# Patient Record
Sex: FEMALE | Race: WHITE | NOT HISPANIC OR LATINO
[De-identification: names, ages, dates, MRNs, and addresses within clinical notes are randomized per-mention and may not be internally consistent; named-entity substitution may affect disease eponyms.]

---

## 2017-11-07 ENCOUNTER — RECORDS - HEALTHEAST (OUTPATIENT)
Dept: ADMINISTRATIVE | Facility: OTHER | Age: 47
End: 2017-11-07

## 2017-11-19 ENCOUNTER — RECORDS - HEALTHEAST (OUTPATIENT)
Dept: ADMINISTRATIVE | Facility: OTHER | Age: 47
End: 2017-11-19

## 2017-11-24 ENCOUNTER — OFFICE VISIT - HEALTHEAST (OUTPATIENT)
Dept: INTERNAL MEDICINE | Facility: CLINIC | Age: 47
End: 2017-11-24

## 2017-11-24 ENCOUNTER — COMMUNICATION - HEALTHEAST (OUTPATIENT)
Dept: TELEHEALTH | Facility: CLINIC | Age: 47
End: 2017-11-24

## 2017-11-24 DIAGNOSIS — E78.5 HYPERLIPIDEMIA: ICD-10-CM

## 2017-11-24 DIAGNOSIS — Z12.31 ENCOUNTER FOR SCREENING MAMMOGRAM FOR BREAST CANCER: ICD-10-CM

## 2017-11-24 DIAGNOSIS — Z76.89 ENCOUNTER TO ESTABLISH CARE: ICD-10-CM

## 2017-11-24 DIAGNOSIS — J45.20 MILD INTERMITTENT ASTHMA WITHOUT COMPLICATION: ICD-10-CM

## 2017-11-24 DIAGNOSIS — Z13.29 SCREENING FOR THYROID DISORDER: ICD-10-CM

## 2017-11-24 DIAGNOSIS — N39.3 STRESS INCONTINENCE IN FEMALE: ICD-10-CM

## 2017-11-24 DIAGNOSIS — Z23 ENCOUNTER FOR VACCINATION: ICD-10-CM

## 2017-11-24 DIAGNOSIS — Z01.89 ENCOUNTER FOR ROUTINE LABORATORY TESTING: ICD-10-CM

## 2017-11-24 DIAGNOSIS — Z85.3 HX OF BREAST CANCER: ICD-10-CM

## 2017-11-24 DIAGNOSIS — Z76.0 ENCOUNTER FOR MEDICATION REFILL: ICD-10-CM

## 2017-11-24 RX ORDER — ALBUTEROL SULFATE 0.83 MG/ML
2.5 SOLUTION RESPIRATORY (INHALATION) EVERY 4 HOURS PRN
Qty: 50 VIAL | Refills: 3 | Status: SHIPPED | OUTPATIENT
Start: 2017-11-24

## 2017-11-24 RX ORDER — LORATADINE 10 MG/1
TABLET ORAL
Status: SHIPPED | COMMUNITY
Start: 2017-11-24

## 2017-11-24 RX ORDER — ACETAMINOPHEN 500 MG
500 TABLET ORAL
Status: SHIPPED | COMMUNITY
Start: 2017-11-24

## 2017-11-24 RX ORDER — IBUPROFEN 200 MG
200 TABLET ORAL
Status: SHIPPED | COMMUNITY
Start: 2010-10-13

## 2017-11-25 ENCOUNTER — COMMUNICATION - HEALTHEAST (OUTPATIENT)
Dept: INTERNAL MEDICINE | Facility: CLINIC | Age: 47
End: 2017-11-25

## 2018-02-27 ENCOUNTER — COMMUNICATION - HEALTHEAST (OUTPATIENT)
Dept: INTERNAL MEDICINE | Facility: CLINIC | Age: 48
End: 2018-02-27

## 2018-03-08 ENCOUNTER — COMMUNICATION - HEALTHEAST (OUTPATIENT)
Dept: INTERNAL MEDICINE | Facility: CLINIC | Age: 48
End: 2018-03-08

## 2018-03-19 ENCOUNTER — HOSPITAL ENCOUNTER (OUTPATIENT)
Dept: MAMMOGRAPHY | Facility: CLINIC | Age: 48
Discharge: HOME | End: 2018-03-19

## 2018-03-19 DIAGNOSIS — Z12.31 ENCOUNTER FOR SCREENING MAMMOGRAM FOR BREAST CANCER: ICD-10-CM

## 2018-07-13 ENCOUNTER — OFFICE VISIT - HEALTHEAST (OUTPATIENT)
Dept: FAMILY MEDICINE | Facility: CLINIC | Age: 48
End: 2018-07-13

## 2018-07-13 ENCOUNTER — COMMUNICATION - HEALTHEAST (OUTPATIENT)
Dept: INTERNAL MEDICINE | Facility: CLINIC | Age: 48
End: 2018-07-13

## 2018-07-13 DIAGNOSIS — S91.332A PUNCTURE WOUND OF LEFT FOOT, INITIAL ENCOUNTER: ICD-10-CM

## 2018-07-18 ENCOUNTER — RECORDS - HEALTHEAST (OUTPATIENT)
Dept: ADMINISTRATIVE | Facility: OTHER | Age: 48
End: 2018-07-18

## 2018-08-23 ENCOUNTER — RECORDS - HEALTHEAST (OUTPATIENT)
Dept: ADMINISTRATIVE | Facility: OTHER | Age: 48
End: 2018-08-23

## 2018-08-27 ENCOUNTER — OFFICE VISIT - HEALTHEAST (OUTPATIENT)
Dept: FAMILY MEDICINE | Facility: CLINIC | Age: 48
End: 2018-08-27

## 2018-08-27 DIAGNOSIS — N39.3 FEMALE STRESS INCONTINENCE: ICD-10-CM

## 2018-08-27 DIAGNOSIS — J45.20 MILD INTERMITTENT ASTHMA WITHOUT COMPLICATION: ICD-10-CM

## 2018-08-27 DIAGNOSIS — Z85.3 HX OF BREAST CANCER: ICD-10-CM

## 2018-08-27 DIAGNOSIS — Z01.818 PREOPERATIVE EXAMINATION: ICD-10-CM

## 2018-08-27 LAB
ALBUMIN SERPL-MCNC: 3.8 G/DL (ref 3.5–5)
ALP SERPL-CCNC: 74 U/L (ref 45–120)
ALT SERPL W P-5'-P-CCNC: 16 U/L (ref 0–45)
ANION GAP SERPL CALCULATED.3IONS-SCNC: 10 MMOL/L (ref 5–18)
AST SERPL W P-5'-P-CCNC: 16 U/L (ref 0–40)
BASOPHILS # BLD AUTO: 0 THOU/UL (ref 0–0.2)
BASOPHILS NFR BLD AUTO: 1 % (ref 0–2)
BILIRUB SERPL-MCNC: 0.2 MG/DL (ref 0–1)
BUN SERPL-MCNC: 11 MG/DL (ref 8–22)
CALCIUM SERPL-MCNC: 10.1 MG/DL (ref 8.5–10.5)
CHLORIDE BLD-SCNC: 108 MMOL/L (ref 98–107)
CO2 SERPL-SCNC: 26 MMOL/L (ref 22–31)
CREAT SERPL-MCNC: 0.81 MG/DL (ref 0.6–1.1)
EOSINOPHIL # BLD AUTO: 0.4 THOU/UL (ref 0–0.4)
EOSINOPHIL NFR BLD AUTO: 6 % (ref 0–6)
ERYTHROCYTE [DISTWIDTH] IN BLOOD BY AUTOMATED COUNT: 12.1 % (ref 11–14.5)
GFR SERPL CREATININE-BSD FRML MDRD: >60 ML/MIN/1.73M2
GLUCOSE BLD-MCNC: 102 MG/DL (ref 70–125)
HCT VFR BLD AUTO: 41.1 % (ref 35–47)
HGB BLD-MCNC: 14.1 G/DL (ref 12–16)
INR PPP: 0.87 (ref 0.9–1.1)
LYMPHOCYTES # BLD AUTO: 2.1 THOU/UL (ref 0.8–4.4)
LYMPHOCYTES NFR BLD AUTO: 35 % (ref 20–40)
MCH RBC QN AUTO: 29.4 PG (ref 27–34)
MCHC RBC AUTO-ENTMCNC: 34.4 G/DL (ref 32–36)
MCV RBC AUTO: 85 FL (ref 80–100)
MONOCYTES # BLD AUTO: 0.4 THOU/UL (ref 0–0.9)
MONOCYTES NFR BLD AUTO: 7 % (ref 2–10)
NEUTROPHILS # BLD AUTO: 3 THOU/UL (ref 2–7.7)
NEUTROPHILS NFR BLD AUTO: 51 % (ref 50–70)
PLATELET # BLD AUTO: 267 THOU/UL (ref 140–440)
PMV BLD AUTO: 7.9 FL (ref 7–10)
POTASSIUM BLD-SCNC: 5 MMOL/L (ref 3.5–5)
PROT SERPL-MCNC: 6.9 G/DL (ref 6–8)
RBC # BLD AUTO: 4.81 MILL/UL (ref 3.8–5.4)
SODIUM SERPL-SCNC: 144 MMOL/L (ref 136–145)
WBC: 5.9 THOU/UL (ref 4–11)

## 2018-08-27 ASSESSMENT — MIFFLIN-ST. JEOR: SCORE: 1374.78

## 2018-08-28 ENCOUNTER — COMMUNICATION - HEALTHEAST (OUTPATIENT)
Dept: FAMILY MEDICINE | Facility: CLINIC | Age: 48
End: 2018-08-28

## 2018-09-26 ENCOUNTER — RECORDS - HEALTHEAST (OUTPATIENT)
Dept: ADMINISTRATIVE | Facility: OTHER | Age: 48
End: 2018-09-26

## 2018-12-07 ENCOUNTER — COMMUNICATION - HEALTHEAST (OUTPATIENT)
Dept: INTERNAL MEDICINE | Facility: CLINIC | Age: 48
End: 2018-12-07

## 2018-12-07 DIAGNOSIS — J45.20 MILD INTERMITTENT ASTHMA WITHOUT COMPLICATION: ICD-10-CM

## 2019-05-07 ENCOUNTER — COMMUNICATION - HEALTHEAST (OUTPATIENT)
Dept: SCHEDULING | Facility: CLINIC | Age: 49
End: 2019-05-07

## 2019-05-07 ENCOUNTER — OFFICE VISIT - HEALTHEAST (OUTPATIENT)
Dept: FAMILY MEDICINE | Facility: CLINIC | Age: 49
End: 2019-05-07

## 2019-05-07 DIAGNOSIS — Z12.31 VISIT FOR SCREENING MAMMOGRAM: ICD-10-CM

## 2019-05-07 DIAGNOSIS — M25.562 ACUTE PAIN OF LEFT KNEE: ICD-10-CM

## 2019-05-07 ASSESSMENT — MIFFLIN-ST. JEOR: SCORE: 1397.01

## 2019-05-08 ENCOUNTER — COMMUNICATION - HEALTHEAST (OUTPATIENT)
Dept: FAMILY MEDICINE | Facility: CLINIC | Age: 49
End: 2019-05-08

## 2019-05-10 ENCOUNTER — COMMUNICATION - HEALTHEAST (OUTPATIENT)
Dept: FAMILY MEDICINE | Facility: CLINIC | Age: 49
End: 2019-05-10

## 2019-06-10 ENCOUNTER — COMMUNICATION - HEALTHEAST (OUTPATIENT)
Dept: INTERNAL MEDICINE | Facility: CLINIC | Age: 49
End: 2019-06-10

## 2019-06-10 DIAGNOSIS — J45.20 MILD INTERMITTENT ASTHMA WITHOUT COMPLICATION: ICD-10-CM

## 2019-06-11 RX ORDER — ALBUTEROL SULFATE 90 UG/1
AEROSOL, METERED RESPIRATORY (INHALATION)
Qty: 18 G | Refills: 5 | Status: SHIPPED | OUTPATIENT
Start: 2019-06-11

## 2019-08-19 ENCOUNTER — HOSPITAL ENCOUNTER (OUTPATIENT)
Dept: MAMMOGRAPHY | Facility: CLINIC | Age: 49
Discharge: HOME | End: 2019-08-19
Attending: FAMILY MEDICINE

## 2019-08-19 DIAGNOSIS — Z12.31 VISIT FOR SCREENING MAMMOGRAM: ICD-10-CM

## 2019-08-21 ENCOUNTER — COMMUNICATION - HEALTHEAST (OUTPATIENT)
Dept: FAMILY MEDICINE | Facility: CLINIC | Age: 49
End: 2019-08-21

## 2019-08-23 ENCOUNTER — HOSPITAL ENCOUNTER (OUTPATIENT)
Dept: MAMMOGRAPHY | Facility: CLINIC | Age: 49
Discharge: HOME | End: 2019-08-23
Attending: FAMILY MEDICINE

## 2019-08-23 DIAGNOSIS — N64.89 BREAST ASYMMETRY: ICD-10-CM

## 2019-12-26 ENCOUNTER — COMMUNICATION - HEALTHEAST (OUTPATIENT)
Dept: FAMILY MEDICINE | Facility: CLINIC | Age: 49
End: 2019-12-26

## 2019-12-26 DIAGNOSIS — J45.20 MILD INTERMITTENT ASTHMA WITHOUT COMPLICATION: ICD-10-CM

## 2019-12-28 RX ORDER — BUDESONIDE AND FORMOTEROL FUMARATE DIHYDRATE 80; 4.5 UG/1; UG/1
2 AEROSOL RESPIRATORY (INHALATION) 2 TIMES DAILY
Qty: 1 INHALER | Refills: 5 | Status: SHIPPED | OUTPATIENT
Start: 2019-12-28

## 2020-01-06 ENCOUNTER — OFFICE VISIT - HEALTHEAST (OUTPATIENT)
Dept: FAMILY MEDICINE | Facility: CLINIC | Age: 50
End: 2020-01-06

## 2020-01-06 ENCOUNTER — COMMUNICATION - HEALTHEAST (OUTPATIENT)
Dept: SCHEDULING | Facility: CLINIC | Age: 50
End: 2020-01-06

## 2020-01-06 DIAGNOSIS — Z23 NEED FOR VACCINATION: ICD-10-CM

## 2020-01-06 DIAGNOSIS — R14.0 ABDOMINAL BLOATING: ICD-10-CM

## 2020-01-06 LAB
ALBUMIN SERPL-MCNC: 4 G/DL (ref 3.5–5)
ALP SERPL-CCNC: 84 U/L (ref 45–120)
ALT SERPL W P-5'-P-CCNC: 25 U/L (ref 0–45)
ANION GAP SERPL CALCULATED.3IONS-SCNC: 11 MMOL/L (ref 5–18)
AST SERPL W P-5'-P-CCNC: 20 U/L (ref 0–40)
BILIRUB SERPL-MCNC: 0.3 MG/DL (ref 0–1)
BUN SERPL-MCNC: 7 MG/DL (ref 8–22)
CALCIUM SERPL-MCNC: 9.9 MG/DL (ref 8.5–10.5)
CHLORIDE BLD-SCNC: 106 MMOL/L (ref 98–107)
CO2 SERPL-SCNC: 26 MMOL/L (ref 22–31)
CREAT SERPL-MCNC: 0.79 MG/DL (ref 0.6–1.1)
ERYTHROCYTE [DISTWIDTH] IN BLOOD BY AUTOMATED COUNT: 12.8 % (ref 11–14.5)
GFR SERPL CREATININE-BSD FRML MDRD: >60 ML/MIN/1.73M2
GLUCOSE BLD-MCNC: 98 MG/DL (ref 70–125)
HCT VFR BLD AUTO: 45.3 % (ref 35–47)
HGB BLD-MCNC: 14.9 G/DL (ref 12–16)
MCH RBC QN AUTO: 28.3 PG (ref 27–34)
MCHC RBC AUTO-ENTMCNC: 33 G/DL (ref 32–36)
MCV RBC AUTO: 86 FL (ref 80–100)
PLATELET # BLD AUTO: 310 THOU/UL (ref 140–440)
PMV BLD AUTO: 8.5 FL (ref 7–10)
POTASSIUM BLD-SCNC: 4.7 MMOL/L (ref 3.5–5)
PROT SERPL-MCNC: 7.6 G/DL (ref 6–8)
RBC # BLD AUTO: 5.29 MILL/UL (ref 3.8–5.4)
SODIUM SERPL-SCNC: 143 MMOL/L (ref 136–145)
TSH SERPL DL<=0.005 MIU/L-ACNC: 1.21 UIU/ML (ref 0.3–5)
WBC: 6.2 THOU/UL (ref 4–11)

## 2020-01-06 ASSESSMENT — MIFFLIN-ST. JEOR: SCORE: 1457.33

## 2020-01-06 ASSESSMENT — PATIENT HEALTH QUESTIONNAIRE - PHQ9: SUM OF ALL RESPONSES TO PHQ QUESTIONS 1-9: 4

## 2020-01-07 ENCOUNTER — HOSPITAL ENCOUNTER (OUTPATIENT)
Dept: ULTRASOUND IMAGING | Facility: HOSPITAL | Age: 50
Discharge: HOME | End: 2020-01-07
Attending: FAMILY MEDICINE

## 2020-01-07 ENCOUNTER — COMMUNICATION - HEALTHEAST (OUTPATIENT)
Dept: FAMILY MEDICINE | Facility: CLINIC | Age: 50
End: 2020-01-07

## 2020-01-07 DIAGNOSIS — J45.20 MILD INTERMITTENT ASTHMA WITHOUT COMPLICATION: ICD-10-CM

## 2020-01-07 DIAGNOSIS — R14.0 ABDOMINAL BLOATING: ICD-10-CM

## 2021-05-25 ENCOUNTER — RECORDS - HEALTHEAST (OUTPATIENT)
Dept: ADMINISTRATIVE | Facility: CLINIC | Age: 51
End: 2021-05-25

## 2021-05-26 ASSESSMENT — PATIENT HEALTH QUESTIONNAIRE - PHQ9: SUM OF ALL RESPONSES TO PHQ QUESTIONS 1-9: 4

## 2021-05-27 ENCOUNTER — RECORDS - HEALTHEAST (OUTPATIENT)
Dept: ADMINISTRATIVE | Facility: CLINIC | Age: 51
End: 2021-05-27

## 2021-05-28 ASSESSMENT — ASTHMA QUESTIONNAIRES: ACT_TOTALSCORE: 21

## 2021-05-28 NOTE — PROGRESS NOTES
Result is/are normal.  No fracture seen.  Try to stay off the knee, pain medication and ice for the pain and if no better by next week, come back into the clinic.

## 2021-05-28 NOTE — PROGRESS NOTES
Family Medicine Office Visit  [facility]  Patient Name: Sara Bee  Patient Age: 48 y.o.  YOB: 1970  MRN: [id number]    Date of Visit: 2019  Reason for Office Visit:   Chief Complaint   Patient presents with     left knee pain x3 days- fall           Assessment / Plan / Medical Decision Makin. Visit for screening mammogram  - Mammo Screening Right; Future    2. Acute pain of left knee  Will do pain medication - thought to be more contusion.  Continue with ice and rest, pain medication acutely and if no improvement by next week return to office for more imaging.  - HYDROcodone-acetaminophen 5-325 mg per tablet; Take 1 tablet by mouth every 6 (six) hours as needed for pain.  Dispense: 50 tablet; Refill: 0  - XR Knee Left Plus Tunnel VW; Future  - XR Knee Left Plus Tunnel VW        Health Maintenance Review  Health Maintenance   Topic Date Due     DEPRESSION FOLLOW UP  1970     ASTHMA FOLLOW-UP  1970     ADVANCE DIRECTIVES DISCUSSED WITH PATIENT  1990     MAMMOGRAM  2019     ASTHMA CONTROL TEST  2020     TD 18+ HE  2028     INFLUENZA VACCINE RULE BASED  Completed     TDAP ADULT ONE TIME DOSE  Completed     PAP SMEAR  Discontinued         I am having Sara Bee start on HYDROcodone-acetaminophen. I am also having her maintain her acetaminophen, loratadine, ibuprofen, albuterol, albuterol, cholecalciferol (vitamin D3), and SYMBICORT.      HPI:  Sara Bee is a 48 y.o. year old who presents to the office today for fall on .  Walking out of cub and tripped on curb and then landed on the right arm and left knee.   Went straight down on the left knee.  Swelling bruising.  Using ice and off work yesterday, elevated, ibuprofen.  Pain 8/10.  No numbness or tingling.         Review of Systems- pertinent positive in bold:  Constitutional: Fever, chills, night sweats, fainting, weight change, fatigue, seizures,  dizziness, sleeping difficulties, loud snoring/pauses in breathing  Eyes: change in vision, blurred or double vision, redness/eye pain  Ears, nose, mouth, throat: change in hearing, ear pain, hoarseness, difficulty swallowing, sores in the mouth or throat  Respiratory: shortness of breath, cough, bloody sputum, wheezing  Cardiovascular: chest pain, palpitations   Gastrointestinal: abdominal pain, heartburn/indigestion, nausea/vomiting, change in appetite, change in bowel habits, constipation or diarrhea, rectal bleeding/dark stools, difficulty swallowing  Urinary: painful urination, frequent urination, urinary urgency/incontinence, blood in urine/dark urine, nocturia  Musculoskeletal: backache/back pain (new or increasing), weakness, joint pain/stiffness (new or increasing), muscle cramps, swelling of hands, feet, ankles, leg pain/redness  Skin: change in moles/freckles, rash, nodules  Hematologic/lymphatic: swollen lymph glands, abnormal bruising/bleeding  Endocrine: excessive thirst/urination, cold or heat intolerance  Neurologic/emotional: worrisome memory change, numbness/tingling, anxiety, mood swings      Current Scheduled Meds:  Outpatient Encounter Medications as of 5/7/2019   Medication Sig Dispense Refill     acetaminophen (TYLENOL) 500 MG tablet Take 500 mg by mouth.       albuterol (PROAIR HFA;PROVENTIL HFA;VENTOLIN HFA) 90 mcg/actuation inhaler Inhale 2 puffs every 6 (six) hours as needed for wheezing. 1 each 0     cholecalciferol, vitamin D3, (VITAMIN D3) 5,000 unit Tab Take 1 tablet by mouth daily.       ibuprofen (ADVIL,MOTRIN) 200 MG tablet Take 200 mg by mouth.       loratadine (CLARITIN) 10 mg tablet Take by mouth.       SYMBICORT 80-4.5 mcg/actuation inhaler INHALE 2 PUFFS TWICE DAILY 1 Inhaler 5     albuterol (PROVENTIL) 2.5 mg /3 mL (0.083 %) nebulizer solution Take 3 mL (2.5 mg total) by nebulization every 4 (four) hours as needed for wheezing. 50 vial 3     HYDROcodone-acetaminophen 5-325 mg  "per tablet Take 1 tablet by mouth every 6 (six) hours as needed for pain. 50 tablet 0     No facility-administered encounter medications on file as of 5/7/2019.      Past Medical History:   Diagnosis Date     Anxiety     previously tried Wellbutrin     Asthma 2/24/2009     Headache, occipital     Severe at times - began in her teens; tx's with NSAIDs     Hx of breast cancer 11/22/2010    s/p Chemo therapy     Raynaud's disease      Raynauds phenomenon      Past Surgical History:   Procedure Laterality Date     APPENDECTOMY  11/2010     BREAST BIOPSY Left 2008    benign     BREAST RECONSTRUCTION Left 05/2009    Silicone Implant?     BREAST SURGERY Right 09/2008    Breast Lift     FOOT SURGERY       HUMERUS FRACTURE SURGERY Left 10/2012     MASTECTOMY Left 09/2008     TOTAL ABDOMINAL HYSTERECTOMY W/ BILATERAL SALPINGOOPHORECTOMY  11/2010    Benign Mass - Dr. Alcantara     Social History     Tobacco Use     Smoking status: Never Smoker     Smokeless tobacco: Never Used   Substance Use Topics     Alcohol use: Yes     Alcohol/week: 1.2 - 1.8 oz     Types: 2 - 3 Cans of beer per week     Comment: Occasional     Drug use: No       Objective / Physical Examination:  Vitals:    05/07/19 1403   BP: 118/74   Patient Site: Right Arm   Patient Position: Sitting   Cuff Size: Adult Regular   Pulse: 70   SpO2: 98%   Weight: 168 lb 14.4 oz (76.6 kg)   Height: 5' 5\" (1.651 m)     Wt Readings from Last 3 Encounters:   05/07/19 168 lb 14.4 oz (76.6 kg)   08/27/18 164 lb (74.4 kg)   07/13/18 158 lb 9.6 oz (71.9 kg)     BP Readings from Last 3 Encounters:   05/07/19 118/74   08/27/18 130/82   07/13/18 106/56     Body mass index is 28.11 kg/m .   Results for orders placed or performed in visit on 08/27/18   Comprehensive Metabolic Panel   Result Value Ref Range    Sodium 144 136 - 145 mmol/L    Potassium 5.0 3.5 - 5.0 mmol/L    Chloride 108 (H) 98 - 107 mmol/L    CO2 26 22 - 31 mmol/L    Anion Gap, Calculation 10 5 - 18 mmol/L    Glucose 102 " 70 - 125 mg/dL    BUN 11 8 - 22 mg/dL    Creatinine 0.81 0.60 - 1.10 mg/dL    GFR MDRD Af Amer >60 >60 mL/min/1.73m2    GFR MDRD Non Af Amer >60 >60 mL/min/1.73m2    Bilirubin, Total 0.2 0.0 - 1.0 mg/dL    Calcium 10.1 8.5 - 10.5 mg/dL    Protein, Total 6.9 6.0 - 8.0 g/dL    Albumin 3.8 3.5 - 5.0 g/dL    Alkaline Phosphatase 74 45 - 120 U/L    AST 16 0 - 40 U/L    ALT 16 0 - 45 U/L   INR   Result Value Ref Range    INR 0.87 (L) 0.90 - 1.10   HM1 (CBC with Diff)   Result Value Ref Range    WBC 5.9 4.0 - 11.0 thou/uL    RBC 4.81 3.80 - 5.40 mill/uL    Hemoglobin 14.1 12.0 - 16.0 g/dL    Hematocrit 41.1 35.0 - 47.0 %    MCV 85 80 - 100 fL    MCH 29.4 27.0 - 34.0 pg    MCHC 34.4 32.0 - 36.0 g/dL    RDW 12.1 11.0 - 14.5 %    Platelets 267 140 - 440 thou/uL    MPV 7.9 7.0 - 10.0 fL    Neutrophils % 51 50 - 70 %    Lymphocytes % 35 20 - 40 %    Monocytes % 7 2 - 10 %    Eosinophils % 6 0 - 6 %    Basophils % 1 0 - 2 %    Neutrophils Absolute 3.0 2.0 - 7.7 thou/uL    Lymphocytes Absolute 2.1 0.8 - 4.4 thou/uL    Monocytes Absolute 0.4 0.0 - 0.9 thou/uL    Eosinophils Absolute 0.4 0.0 - 0.4 thou/uL    Basophils Absolute 0.0 0.0 - 0.2 thou/uL           General Appearance: Alert and oriented, cooperative, affect appropriate, speech clear, in no apparent distress  Head: Normocephalic, atraumatic  Extremities: Pulses 2+ and equal throughout.left knee large ecchymosis on the knee with varying degree of discoloration.  Minimal swelling, full extension but pain with flexion, no joint laxity appreciated.  Integumentary: Warm and dry. Without suspicious looking lesions  Neuro: Alert and oriented, follows commands appropriately.     Orders Placed This Encounter   Procedures     XR Knee Left Plus Tunnel VW     Mammo Screening Right   Followup: No follow-ups on file. earlier if needed.    Total time spent with patient was 15 min with >50% of time spent in face-to-face counseling regarding the above plan       Johanny Frankel MD

## 2021-05-28 NOTE — TELEPHONE ENCOUNTER
Fall Sunday and hit on left knee.  Very swollen and painful.  Ice and elevation yesterday.    No appts on mychart till Thursday.    Patient needs to be seen.    Transferred to scheduling for an appointment for today.    Jacqueline Booth RN, [facility] Nurse Triage/Med Refills RN       Reason for Disposition    SEVERE pain (e.g., excruciating)    Protocols used: KNEE INJURY-A-OH

## 2021-05-29 NOTE — TELEPHONE ENCOUNTER
Refill Approved    Rx renewed per Medication Renewal Policy. Medication was last renewed on 11/24/17. Last visit 5/7/19.    Karlee Rosenthal, [facility] Triage/Med Refill 6/11/2019     Requested Prescriptions   Pending Prescriptions Disp Refills     albuterol (PROAIR HFA;PROVENTIL HFA;VENTOLIN HFA) 90 mcg/actuation inhaler [Pharmacy Med Name: ALBUTEROL HFA INH (200 PUFFS) 18GM] 18 g 0     Sig: INHALE 2 PUFFS EVERY 6 HOURS AS NEEDED FOR WHEEZING       Albuterol/Levalbuterol Refill Protocol Passed - 6/10/2019  7:37 AM        Passed - PCP or prescribing provider visit in last year     Last office visit with prescriber/PCP: 11/24/2017 Campbell Wagner CNP OR same dept: Visit date not found OR same specialty: 11/24/2017 Campbell Wagner CNP Last physical: Visit date not found       Next appt within 3 mo: Visit date not found  Next physical within 3 mo: Visit date not found  Prescriber OR PCP: Campbell Wagner CNP  Last diagnosis associated with med order: 1. Mild intermittent asthma without complication  - albuterol (PROAIR HFA;PROVENTIL HFA;VENTOLIN HFA) 90 mcg/actuation inhaler [Pharmacy Med Name: ALBUTEROL HFA INH (200 PUFFS) 18GM]; INHALE 2 PUFFS EVERY 6 HOURS AS NEEDED FOR WHEEZING  Dispense: 18 g; Refill: 0    If protocol passes may refill for 6 months if within 3 months of last provider visit (or a total of 9 months). If patient requesting >1 inhaler per month refill x 6 months and have patient make appointment with provider.

## 2021-05-30 ENCOUNTER — RECORDS - HEALTHEAST (OUTPATIENT)
Dept: ADMINISTRATIVE | Facility: CLINIC | Age: 51
End: 2021-05-30

## 2021-05-31 ENCOUNTER — RECORDS - HEALTHEAST (OUTPATIENT)
Dept: ADMINISTRATIVE | Facility: CLINIC | Age: 51
End: 2021-05-31

## 2021-05-31 VITALS — WEIGHT: 161.9 LBS

## 2021-06-01 VITALS — WEIGHT: 158.6 LBS

## 2021-06-01 VITALS — HEIGHT: 65 IN | WEIGHT: 164 LBS | BODY MASS INDEX: 27.32 KG/M2

## 2021-06-03 VITALS — BODY MASS INDEX: 28.14 KG/M2 | WEIGHT: 168.9 LBS | HEIGHT: 65 IN

## 2021-06-04 VITALS
WEIGHT: 182.2 LBS | BODY MASS INDEX: 30.35 KG/M2 | DIASTOLIC BLOOD PRESSURE: 80 MMHG | HEART RATE: 81 BPM | SYSTOLIC BLOOD PRESSURE: 120 MMHG | OXYGEN SATURATION: 99 % | HEIGHT: 65 IN

## 2021-06-04 NOTE — PROGRESS NOTES
Assessment:     1. Abdominal bloating  Comprehensive Metabolic Panel    HM2(CBC w/o Differential)    Thyroid Sinking Spring    US Abdomen Complete       Plan:     1. Abdominal bloating  Patient will try to do 10,000 steps per day and lose a little weight; will work up as follows  - Comprehensive Metabolic Panel  - HM2(CBC w/o Differential)  - Thyroid Sinking Spring  - US Abdomen Complete; Future      Subjective:   This is my first visit with Sara.  She is a little upset with herself as she was told by her mother-in-law that she looked bloated over the holidays.  She does state that she did overeat during the holidays.  However I recheck her weight 6 months ago and her weight is the same 100 and pounds.  I suggested the patient can increase her aerobic exercise to 10,000 steps per day.  She denies any nausea or vomiting hematemesis hemoptysis cough shortness of breath dyspnea chest pain angina bowel changes.  I have reviewed her chart.  Examination is essentially unremarkable.  I want to rule out any intra-abdominal causes that are causing this postprandial bloat and I will do an ultrasound and the above blood work-up.  However I did reassure the patient that based on my clinical exam this first visit I do not think anything acute or subacute is going on.  She was reassured.  She will follow-up with her primary care physician and sooner as I get the test results.    Review of Systems: A complete 14 point review of systems was obtained and is negative or as stated in the history of present illness.    Past Medical History:   Diagnosis Date     Anxiety     previously tried Wellbutrin     Asthma 2/24/2009     Headache, occipital     Severe at times - began in her teens; tx's with NSAIDs     Hx of breast cancer 11/22/2010    s/p Chemo therapy     Raynaud's disease      Raynauds phenomenon      Family History   Problem Relation Age of Onset     Lung cancer Mother         s/p RLLobectomy; Spinal stenosis     Hypertension Mother       "Hyperlipidemia Mother      Cancer Father         abdominal sarcoma     No Medical Problems Sister      No Medical Problems Brother      Glaucoma Sister         son with detached retinas     No Medical Problems Son      No Medical Problems Son      Breast cancer Neg Hx      Past Surgical History:   Procedure Laterality Date     APPENDECTOMY  11/2010     BREAST BIOPSY Left 2008    benign     BREAST RECONSTRUCTION Left 05/2009    Silicone Implant?     BREAST SURGERY Right 09/2008    Breast Lift     FOOT SURGERY       HUMERUS FRACTURE SURGERY Left 10/2012     MASTECTOMY Left 09/2008     TOTAL ABDOMINAL HYSTERECTOMY W/ BILATERAL SALPINGOOPHORECTOMY  11/2010    Benign Mass - Dr. Alcantara     Social History     Tobacco Use     Smoking status: Never Smoker     Smokeless tobacco: Never Used   Substance Use Topics     Alcohol use: Yes     Alcohol/week: 2.0 - 3.0 standard drinks     Types: 2 - 3 Cans of beer per week     Comment: Occasional     Drug use: No         Objective:   /80 (Patient Site: Left Arm, Patient Position: Sitting, Cuff Size: Adult Regular)   Pulse 81   Ht 5' 5\" (1.651 m)   Wt 182 lb 3.2 oz (82.6 kg)   SpO2 99%   BMI 30.32 kg/m      General Appearance:  Alert, cooperative, no distress  Head:  Normocephalic, no obvious abnormality  Ears: TM anatomy normal  Eyes:  PERRL, EOM's intact, conjunctiva and corneas clear  Nose:  Nares symmetrical, septum midline, mucosa pink, no sinus tenderness  Throat:  Lips, tongue, and mucosa are moist, pink, and intact  Neck:  Supple, symmetrical, trachea midline, no adenopathy; thyroid: no enlargement, symmetric,no tenderness/mass/nodules; no carotid bruit, no JVD  Back:  Symmetrical, no curvature, ROM normal, no CVA tenderness  Chest/Breast:  No mass or tenderness  Lungs:  Clear to auscultation bilaterally, respirations unlabored   Heart:  Normal PMI, regular rate & rhythm, S1 and S2 normal, no murmurs, rubs, or gallops  Abdomen:  Soft, non-tender, bowel sounds active " all four quadrants, no mass, or organomegaly  Musculoskeletal:  Tone and strength strong and symmetrical, all extremities  Lymphatic:  No adenopathy  Skin/Hair/Nails:  Skin warm, dry, and intact, no rashes  Neurologic:  Alert and oriented x3, no cranial nerve deficits, normal strength and tone, gait steady  Extremities:  No edema.  Elly's sign negative.    Genitourinary: deferred  Pulses:  Equal bilaterally     The following high BMI interventions were performed this visit: encouragement to exercise      This note has been dictated using voice recognition software. Any grammatical or context distortions are unintentional and inherent to the the software.

## 2021-06-04 NOTE — TELEPHONE ENCOUNTER
Refill Approved    Rx renewed per Medication Renewal Policy. Medication was last renewed on 12/7/18.    Elsi Linda, [facility] Triage/Med Refill 12/28/2019     Requested Prescriptions   Pending Prescriptions Disp Refills     budesonide-formoterol (SYMBICORT) 80-4.5 mcg/actuation inhaler 1 Inhaler 5     Sig: Inhale 2 puffs 2 (two) times a day.       Asthma Medications Refill Protocol Passed - 12/26/2019 12:31 PM        Passed - PCP or prescribing provider visit in last year     Last office visit with prescriber/PCP: 5/7/2019 Johanny Frankel MD OR same dept: 5/7/2019 Johanny Frankel MD OR same specialty: 5/7/2019 Johanny Frankel MD  Last physical: 8/27/2018 Last MTM visit: Visit date not found    Next appt within 3 mo: Visit date not found Next physical within 3 mo: Visit date not found  Prescriber OR PCP: Johanny Frankel MD  Last diagnosis associated with med order: 1. Mild intermittent asthma without complication  - budesonide-formoterol (SYMBICORT) 80-4.5 mcg/actuation inhaler; Inhale 2 puffs 2 (two) times a day.  Dispense: 1 Inhaler; Refill: 5    If protocol passes may refill for 6 months if within 3 months of last provider visit (or a total of 9 months).

## 2021-06-04 NOTE — TELEPHONE ENCOUNTER
Triage call:   Difficulty breathing- feels bloated under her breasts- has to lay back to take a deep breath- shortness of breath when she was tying her shoes- she reports that it has been happening more frequently.   ON going for 2 months - around the holidays it was worse-comes and goes   She reports that now she feels shortness of breath now  Has asthma- takes her inhaler as prescribed  She feels like she has Increased pressure on her diaphragm  Mild shortness of breath   No issues prior- asthma was well controlled  She reports that she is over weight as well and is wondering if her symptoms may be related to this- would like to start a program   Denies additional symptoms at this time.     Triaged to be seen in the office now- reviewed additional care advice with patient and she verbalizes understanding. Patient warm transferred to scheduling for appointment. Appointment scheduled with Dr Goodman @ 9:20 am today.     Loyda Urbina RN BSBA [facility] Triage/Med Refill 1/6/2020 8:08 AM    Reason for Disposition    MILD difficulty breathing (e.g., minimal/no SOB at rest, SOB with walking, pulse < 100) of new onset or worse than normal    Protocols used: BREATHING DIFFICULTY-A-OH

## 2021-06-05 NOTE — TELEPHONE ENCOUNTER
----- Message from Jason Goodman MD sent at 1/7/2020 10:28 AM CST -----  plz call and tell her resullts are normal

## 2021-06-05 NOTE — TELEPHONE ENCOUNTER
Subjective:   This is my first visit with Sara.  She is a little upset with herself as she was told by her mother-in-law that she looked bloated over the holidays.  She does state that she did overeat during the holidays.  However I recheck her weight 6 months ago and her weight is the same 100 and pounds.  I suggested the patient can increase her aerobic exercise to 10,000 steps per day.  She denies any nausea or vomiting hematemesis hemoptysis cough shortness of breath dyspnea chest pain angina bowel changes.  I have reviewed her chart.  Examination is essentially unremarkable.  I want to rule out any intra-abdominal causes that are causing this postprandial bloat and I will do an ultrasound and the above blood work-up.  However I did reassure the patient that based on my clinical exam this first visit I do not think anything acute or subacute is going on.  She was reassured.  She will follow-up with her primary care physician and sooner as I get the test results.       US Abdomen Complete  Narrative: EXAM: US ABDOMEN COMPLETE  LOCATION: [facility]  DATE/TIME: 1/7/2020 6:47 AM    INDICATION: Abdominal distension (gaseous)  COMPARISON: None.  TECHNIQUE: Complete abdominal ultrasound.    FINDINGS:    GALLBLADDER: Normal. No gallstones, wall thickening, or pericholecystic fluid. Negative sonographic Hooper's sign.    BILE DUCTS: No biliary dilatation. The common duct measures 3 mm.    LIVER: Normal parenchyma with smooth contour. No focal mass.    RIGHT KIDNEY: 10.4 x 4.3 x 5.1 cm Normal echogenicity with no hydronephrosis or mass.     LEFT KIDNEY: 10.4 x 4.8 x 5.5 Normal echogenicity with no hydronephrosis or mass.     SPLEEN: Normal.    PANCREAS: The visualized portions are normal. The pancreas is largely obscured by bowel gas.    AORTA: Normal in caliber.     IVC: Normal where visualized.    No ascites.  Impression: 1.  Pancreas partially obscured by bowel gas and suboptimally visualized. Otherwise  normal abdominal ultrasound.

## 2021-06-16 PROBLEM — F33.1 MAJOR DEPRESSIVE DISORDER, RECURRENT EPISODE, MODERATE (H): Status: ACTIVE | Noted: 2017-11-24

## 2021-06-19 NOTE — LETTER
Letter by Johanny Frankel MD at      Author: Johanny Frankel MD Service: -- Author Type: --    Filed:  Encounter Date: 5/8/2019 Status: (Other)         Sara Bee  [id number] [facility] [id number]             May 8, 2019         Dear Ms. Bee,    Below are the results from your recent visit:    Resulted Orders   XR Knee Left Plus Tunnel VW    Narrative    EXAM DATE:         05/07/2019    EXAM: X-RAY KNEE, LEFT, 3 VIEWS  LOCATION: [facility]  DATE/TIME: 5/7/2019 2:30 PM    INDICATION: Pain in left knee  COMPARISON: None.    FINDINGS: Mild degenerative change medial compartment left knee. No evidence for  fracture or significant effusion.                  Result is/are normal.  No fracture seen.  Try to stay off the knee, pain medication and ice for the  pain and if no better by next week, come back into the clinic.     Please call with questions or contact us using [employer].    Sincerely,        Electronically signed by Johanny Frankel MD

## 2021-06-20 NOTE — PROGRESS NOTES
Preoperative Exam    Scheduled Procedure: Laparoscopic Bladder Repair    Surgery Date:  9/11/18  Surgery Location: [facility]     Surgeon:  Dr. Estrada     Assessment/Plan:     1. Female stress incontinence  Plan for surgery    2. Preoperative examination  Cleared for surgery - no absolute contraindications to surgery  - Comprehensive Metabolic Panel  - HM1(CBC and Differential)  - INR  - APTT(PTT)  - HM1 (CBC with Diff)    3. Hx of breast cancer  Continue with current management    4. Mild intermittent asthma without complication  Well controlled and ACT reviewed today.          Surgical Procedure Risk: Low (reported cardiac risk generally < 1%)  Have you had prior anesthesia?: Yes  Have you or any family members had a previous anesthesia reaction:  No  Do you or any family members have a history of a clotting or bleeding disorder?: No  Cardiac Risk Assessment: no increased risk for major cardiac complications    Patient approved for surgery with general or local anesthesia.    Please Note:    Functional Status: Independent  Patient plans to recover at home with family.     Subjective:      Sara Bee is a 48 y.o. female who presents for a preoperative consultation.  Stress incontinence for many years and planning on bladder repair.  Hx of breast cancer with left mastectomy.  Hx of mild intermittent asthma and well controlled.    All other systems reviewed and are negative, other than those listed in the HPI.    Pertinent History  Do you have difficulty breathing or chest pain after walking up a flight of stairs: No  History of obstructive sleep apnea: No  Steroid use in the last 6 months: Yes: Symbicort   Frequent Aspirin/NSAID use: No  Prior Blood Transfusion: No  Prior Blood Transfusion Reaction: No  If for some reason prior to, during or after the procedure, if it is medically indicated, would you be willing to have a blood transfusion?:  There is no transfusion refusal.    Current Outpatient  Prescriptions   Medication Sig Dispense Refill     acetaminophen (TYLENOL) 500 MG tablet Take 500 mg by mouth.       albuterol (PROAIR HFA;PROVENTIL HFA;VENTOLIN HFA) 90 mcg/actuation inhaler Inhale 2 puffs every 6 (six) hours as needed for wheezing. 1 each 0     albuterol (PROVENTIL) 2.5 mg /3 mL (0.083 %) nebulizer solution Take 3 mL (2.5 mg total) by nebulization every 4 (four) hours as needed for wheezing. 50 vial 3     budesonide-formoterol (SYMBICORT) 80-4.5 mcg/actuation inhaler Inhale 2 puffs 2 (two) times a day. 1 Inhaler 11     cholecalciferol, vitamin D3, (VITAMIN D3) 5,000 unit Tab Take 1 tablet by mouth daily.       ibuprofen (ADVIL,MOTRIN) 200 MG tablet Take 200 mg by mouth.       loratadine (CLARITIN) 10 mg tablet Take by mouth.       No current facility-administered medications for this visit.         No Known Allergies    Patient Active Problem List   Diagnosis     Hx of breast cancer     Major depressive disorder, recurrent episode, moderate (H)     Ovarian cyst     Asthma       Past Medical History:   Diagnosis Date     Anxiety     previously tried Wellbutrin     Asthma 2/24/2009     Headache, occipital     Severe at times - began in her teens; tx's with NSAIDs     Hx of breast cancer 11/22/2010    s/p Chemo therapy     Raynaud's disease      Raynauds phenomenon        Past Surgical History:   Procedure Laterality Date     APPENDECTOMY  11/2010     BREAST BIOPSY Left 2008    benign     BREAST RECONSTRUCTION Left 05/2009    Silicone Implant?     BREAST SURGERY Right 09/2008    Breast Lift     FOOT SURGERY       HUMERUS FRACTURE SURGERY Left 10/2012     MASTECTOMY Left 09/2008     TOTAL ABDOMINAL HYSTERECTOMY W/ BILATERAL SALPINGOOPHORECTOMY  11/2010    Benign Mass - Dr. 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[employer]  "    Social History Main Topics     Smoking status: Never Smoker     Smokeless tobacco: Never Used     Alcohol use 1.2 - 1.8 oz/week     2 - 3 Cans of beer per week      Comment: Occasional     Drug use: No     Sexual activity: Not Currently     Partners: Male     Birth control/ protection: Surgical     Other Topics Concern     Not on file     Social History Narrative       Patient Care Team:  Johanny Frankel MD as PCP - General (Family Medicine)          Objective:     Vitals:    08/27/18 0754   BP: 130/82   Pulse: 85   SpO2: 99%   Weight: 164 lb (74.4 kg)   Height: 5' 5\" (1.651 m)         Physical Exam:    Physical Exam:  General Appearance: Alert, cooperative, no distress, appears stated age   Head: Normocephalic, without obvious abnormality, atraumatic  Eyes: PERRL, conjunctiva/corneas clear, EOM's intact   Ears: Normal TM's and external ear canals, both ears  Nose:Nares normal, septum midline,mucosa normal, no drainage    Throat:Lips, mucosa, and tongue normal; teeth and gums normal  Neck: Supple, symmetrical, trachea midline, no adenopathy;  thyroid: not enlarged, symmetric, no tenderness/mass/nodules  Back: Symmetric, no curvature, ROM normal,  Lungs: Clear to auscultation bilaterally, respirations unlabored  Heart: Regular rate and rhythm, S1 and S2 normal, no murmur, rub, or gallop  Abdomen: Soft, non-tender, bowel sounds active all four quadrants,  no masses, no organomegaly  Extremities: Extremities normal, atraumatic, no cyanosis or edema  Skin: Skin color, texture, turgor normal, no rashes or lesions  Lymph nodes: Cervical, supraclavicular, and axillary nodes normal  Neurologic: Normal        There are no Patient Instructions on file for this visit.    EKG:  n/a    Labs:  Recent Results (from the past 240 hour(s))   INR    Collection Time: 08/27/18  8:31 AM   Result Value Ref Range    INR 0.87 (L) 0.90 - 1.10   HM1 (CBC with Diff)    Collection Time: 08/27/18  8:31 AM   Result Value Ref Range    WBC 5.9 " 4.0 - 11.0 thou/uL    RBC 4.81 3.80 - 5.40 mill/uL    Hemoglobin 14.1 12.0 - 16.0 g/dL    Hematocrit 41.1 35.0 - 47.0 %    MCV 85 80 - 100 fL    MCH 29.4 27.0 - 34.0 pg    MCHC 34.4 32.0 - 36.0 g/dL    RDW 12.1 11.0 - 14.5 %    Platelets 267 140 - 440 thou/uL    MPV 7.9 7.0 - 10.0 fL    Neutrophils % 51 50 - 70 %    Lymphocytes % 35 20 - 40 %    Monocytes % 7 2 - 10 %    Eosinophils % 6 0 - 6 %    Basophils % 1 0 - 2 %    Neutrophils Absolute 3.0 2.0 - 7.7 thou/uL    Lymphocytes Absolute 2.1 0.8 - 4.4 thou/uL    Monocytes Absolute 0.4 0.0 - 0.9 thou/uL    Eosinophils Absolute 0.4 0.0 - 0.4 thou/uL    Basophils Absolute 0.0 0.0 - 0.2 thou/uL       Immunization History   Administered Date(s) Administered     Influenza, Seasonal, Inj PF IIV3 09/25/2009     Influenza,seasonal quad, PF, 36+MOS 11/24/2017     Influenza,seasonal, Inj IIV3 10/16/2007, 12/14/2011, 11/30/2012     Pneumo Polysac 23-V 12/14/2011     Td, Adult, Absorbed 08/01/2006     Tdap 07/13/2018           Electronically signed by Johanny Frankel MD 08/27/18 7:56 AM

## 2021-06-20 NOTE — PROGRESS NOTES
Result is/are normal.  Please fax result to surgeon with the note. Call if any questions or concerns.

## 2021-06-25 NOTE — PROGRESS NOTES
Progress Notes by Campbell Wagner at 11/24/2017  8:00 AM     Author: Campbell Wagner Service: -- Author Type: Nurse Practitioner    Filed: 1/7/2018  4:31 PM Encounter Date: 11/24/2017 Status: Signed    : [facility]    Date of Service: 11/24/2017  Primary Provider: Campbell Wagner CNP    Patient Care Team:  Campbell Wagner CNP as PCP - General (Nurse Practitioner)     ______________________________________________________________________     Patient's Pharmacy:    [id number] - [place], [place] - [address] AT [facility] [address]  [address]  [facility] [address]  Phone: [phone] Fax: [phone]     Patient's Insurance:    Payor: BLUE CROSS / Plan: BLUE CROSS COMP CARE / Product Type: PPO/POS/FFS /     ______________________________________________________________________    Assessment:    1. Encounter to establish care    2. Encounter for medication refill    3. Encounter for screening mammogram for breast cancer    4. Hx of breast cancer    5. Stress incontinence in female    6. Mild intermittent asthma without complication    7. Encounter for routine laboratory testing    8. Screening for thyroid disorder    9. Hyperlipidemia    10. Encounter for vaccination       ______________________________________________________________________      PHQ-2 Total Score: 0 (11/24/2017  8:30 AM)     Plan:  Patient Instructions   1. Imaging tests ordered at this visit:  - Mammo Screening Bilateral    2. Referrals placed today:  - Ambulatory referral to Oncology  - Ambulatory referral to Urology    3. Medications prescribed at this visit:  - budesonide-formoterol (SYMBICORT) 80-4.5 mcg/actuation inhaler; Inhale 2 puffs 2 (two) times a day.  Dispense: 1 Inhaler; Refill: 11  - albuterol (PROVENTIL) 2.5 mg /3 mL (0.083 %) nebulizer solution; Take 3 mL (2.5 mg total) by nebulization every 4 (four) hours as  needed for wheezing.  Dispense: 50 vial; Refill: 3  - albuterol (PROAIR HFA;PROVENTIL HFA;VENTOLIN HFA) 90 mcg/actuation inhaler; Inhale 2 puffs every 6 (six) hours as needed for wheezing.  Dispense: 1 each; Refill: 0    4. Labs ordered today:  - Comprehensive Metabolic Panel  - HM2(CBC w/o Differential)  - Thyroid Stimulating Hormone (TSH)  - Lipid Cascade FASTING; Future    5. Vaccinations given today:  - Influenza, Seasonal,Quad Inj, 36+ MOS    6. Continue current medications    7. Lab results will be available via [employer].      ______________________________________________________________________     Sara Bee is 47 y.o. female who comes in today for:    Chief Complaint   Patient presents with   ? Establish Care     Refill on asthma meds        Patient Active Problem List   Diagnosis   ? Hx of breast cancer   ? Major depressive disorder, recurrent episode, moderate   ? Ovarian cyst   ? Asthma     Past Medical History:   Diagnosis Date   ? Anxiety     previously tried Wellbutrin   ? Asthma 2/24/2009   ? Headache, occipital     Severe at times - began in her teens; tx's with NSAIDs   ? Hx of breast cancer 11/22/2010    s/p Chemo therapy   ? Raynauds phenomenon        Past Surgical History:   Procedure Laterality Date   ? APPENDECTOMY  11/2010   ? BREAST RECONSTRUCTION Left 05/2009    Silicone Implant?   ? BREAST SURGERY Right 09/2008    Breast Lift   ? HUMERUS FRACTURE SURGERY Left 10/2012   ? MASTECTOMY Left 09/2008   ? TOTAL ABDOMINAL HYSTERECTOMY W/ BILATERAL SALPINGOOPHORECTOMY  11/2010    Benign Mass - Dr. Alcantara       No Known Allergies    Current Outpatient Prescriptions   Medication Sig Note   ? acetaminophen (TYLENOL) 500 MG tablet Take 500 mg by mouth. 11/24/2017: Received from: [facility] Received Sig: Take 500 mg by mouth every 6 hours if needed. Max acetaminophen dose: 4000mg in 24 hrs.    ? albuterol (PROVENTIL) 2.5 mg /3 mL (0.083 %) nebulizer solution Take 3  mL (2.5 mg total) by nebulization every 4 (four) hours as needed for wheezing.    ? budesonide-formoterol (SYMBICORT) 80-4.5 mcg/actuation inhaler Inhale 2 puffs 2 (two) times a day.    ? ibuprofen (ADVIL,MOTRIN) 200 MG tablet Take 200 mg by mouth. 11/24/2017: Received from: [facility] Received Sig: Take 1 tablet by mouth 4 times daily if needed.   ? loratadine (CLARITIN) 10 mg tablet Take by mouth. 11/24/2017: Received from: [facility] Received Sig: take one tab as needed   ? albuterol (PROAIR HFA;PROVENTIL HFA;VENTOLIN HFA) 90 mcg/actuation inhaler Inhale 2 puffs every 6 (six) hours as needed for wheezing.      Social History     Social History   ? Marital status:      Spouse name: Kelby   ? Number of children: 2   ? Years of education: College     Occupational History   ? 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 Smoking status: Never Smoker   ? Smokeless tobacco: Never Used   ? Alcohol use 1.2 - 1.8 oz/week     2 - 3 Cans of beer per week      Comment: Occasional   ? Drug use: No   ? Sexual activity: Not Currently     Partners: Male     Birth control/ protection: Surgical     Other Topics Concern   ? Not on file     Social History Narrative   ? No narrative on file     Family History   Problem Relation Age of Onset   ? Lung cancer Mother      s/p RLLobectomy; Spinal stenosis   ? Hypertension Mother    ? Hyperlipidemia Mother    ? Cancer Father      abdominal sarcoma   ? No Medical Problems Sister    ? No Medical Problems Brother    ? Glaucoma Sister      son with detached retinas   ? No Medical Problems Son    ? No Medical Problems Son        ______________________________________________________________________     History of present illness: Sara Bee is a pleasant 47 y.o. female with a PMH pertinent for breast cancer - s/p left mastectomy & chemo tx (done 2009), asthma, anxiety, depression,  headaches, Raynaud's phenomenon, and arthritis who presents in clinic today for establish care, medication refills, and routine lab testing.  She would also like a referral to establish with new oncologist as she has a PMH of left breast cancer.  Her last mammogram was in 2016, she is due for this at this time and an order will be placed for this today.  She would also like to be evaluated for stress incontinence and vaginal prolapse as her symptoms have gradually increased over time.  So, a referral to urology for her urinary issues will also be placed at this time. Her asthma has been under control, overall, some occasional wheezing, and is typically allergy-induced. She uses Symbicort twice daily and albuterol HFA/nebs as needed.    *I have reviewed the patient's medical, surgical, social, family history in detail and updated the computerized patient record.     *Previously under the care of Dr. Elena Gan at [facility] in [place], [place]    Review of systems:   10 point review of systems is negative unless noted in the HPI.     ______________________________________________________________________    Wt Readings from Last 3 Encounters:   11/24/17 161 lb 14.4 oz (73.4 kg)   08/29/14 155 lb (70.3 kg)     BP Readings from Last 3 Encounters:   11/24/17 126/60     /60  Pulse 81  Wt 161 lb 14.4 oz (73.4 kg)  SpO2 98%     Physical Exam:  General Appearance: Alert, cooperative, no distress, appears stated age  Head: Normocephalic, without obvious abnormality, atraumatic  Eyes: PERRL, conjunctiva/corneas clear, EOM's intact  Ears: Normal TM's and external ear canals, both ears  Nose: Nares normal, septum midline,mucosa normal, no drainage  Throat: Lips, mucosa, and tongue normal; teeth and gums normal; no erythema, exudate, or thrush  Neck: Supple, symmetrical, trachea midline, no adenopathy;  thyroid: not enlarged, symmetric, no tenderness/mass/nodules  Back: Symmetric, no curvature, ROM normal, no  CVA tenderness  Lungs: Clear to auscultation bilaterally, respirations unlabored  Heart: Regular rate and rhythm, S1 and S2 normal, no murmur, rub, or gallop  Abdomen: Soft, non-tender, bowel sounds active all four quadrants,  no masses, no organomegaly  Musculoskeletal: Normal range of motion. No joint swelling or deformity.   Extremities: Extremities normal, atraumatic, no cyanosis or edema  Skin: Skin color, texture, turgor normal, no rashes or lesions  Lymph nodes: Cervical & supraclavicular nodes normal  Neurologic: She is alert & oriented x 3. She has a normal gait.   Psychiatric: She has a normal mood and affect.     Diagnostics:   Results for orders placed or performed in visit on 11/24/17   Comprehensive Metabolic Panel   Result Value Ref Range    Sodium 142 136 - 145 mmol/L    Potassium 4.5 3.5 - 5.0 mmol/L    Chloride 107 98 - 107 mmol/L    CO2 24 22 - 31 mmol/L    Anion Gap, Calculation 11 5 - 18 mmol/L    Glucose 91 70 - 125 mg/dL    BUN 10 8 - 22 mg/dL    Creatinine 0.84 0.60 - 1.10 mg/dL    GFR MDRD Af Amer >60 >60 mL/min/1.73m2    GFR MDRD Non Af Amer >60 >60 mL/min/1.73m2    Bilirubin, Total 0.4 0.0 - 1.0 mg/dL    Calcium 9.8 8.5 - 10.5 mg/dL    Protein, Total 7.2 6.0 - 8.0 g/dL    Albumin 3.8 3.5 - 5.0 g/dL    Alkaline Phosphatase 73 45 - 120 U/L    AST 15 0 - 40 U/L    ALT 16 0 - 45 U/L   HM2(CBC w/o Differential)   Result Value Ref Range    WBC 5.1 4.0 - 11.0 thou/uL    RBC 5.14 3.80 - 5.40 mill/uL    Hemoglobin 14.9 12.0 - 16.0 g/dL    Hematocrit 43.7 35.0 - 47.0 %    MCV 85 80 - 100 fL    MCH 28.9 27.0 - 34.0 pg    MCHC 34.0 32.0 - 36.0 g/dL    RDW 11.5 11.0 - 14.5 %    Platelets 249 140 - 440 thou/uL    MPV 9.0 7.0 - 10.0 fL   Thyroid Stimulating Hormone (TSH)   Result Value Ref Range    TSH 1.04 0.30 - 5.00 uIU/mL        Campbell Wagner, [facility]  Internal Medicine  [facility]     Return in about 4 months (around 3/24/2018).

## 2021-06-26 NOTE — PROGRESS NOTES
Progress Notes by Familia Newman PA-C at 7/13/2018  4:20 PM     Author: Familia Newamn PA-C Service: -- Author Type: Physician Assistant    Filed: 7/16/2018 10:00 AM Encounter Date: 7/13/2018 Status: Signed    : Familia Newman PA-C (Physician Assistant)       Subjective:      Patient ID: Sara Bee is a 48 y.o. female.    Chief Complaint:    HPI  Sara Bee is a 48 y.o. female who presents today complaining of 1 day history of a puncture wound to the left heel.  Patient states that she was walking in her kitchen where they were remodeling her house and doing the floors.  She was not wearing shoes and she stepped on a board that had a nail sticking in it and punctured the left heel.  She has had difficulty with full weightbearing on the heel secondary to pain but she does not have any retained foreign body sensation into the heel itself. She was not wearing a shoe as mentioned.  She has no constitutional symptoms include fever chills night sweats or fatigue.  At this time she has no drainage out of the heel.    Her last tetanus was unable to be recalled she thinks it is over 10 years old and needs a tetanus booster.    She has no other complaints to address in the office encounter today.  She has not tried any treatment for this.      Past Medical History:   Diagnosis Date   ? Anxiety     previously tried Wellbutrin   ? Asthma 2/24/2009   ? Headache, occipital     Severe at times - began in her teens; tx's with NSAIDs   ? Hx of breast cancer 11/22/2010    s/p Chemo therapy   ? Raynauds phenomenon        Past Surgical History:   Procedure Laterality Date   ? APPENDECTOMY  11/2010   ? BREAST BIOPSY Left 2008    benign   ? BREAST RECONSTRUCTION Left 05/2009    Silicone Implant?   ? BREAST SURGERY Right 09/2008    Breast Lift   ? HUMERUS FRACTURE SURGERY Left 10/2012   ? MASTECTOMY Left 09/2008   ? TOTAL ABDOMINAL HYSTERECTOMY W/ BILATERAL SALPINGOOPHORECTOMY  11/2010    Benign Mass - Dr. Quinton Oliveira  History   Problem Relation Age of Onset   ? Lung cancer Mother      s/p RLLobectomy; Spinal stenosis   ? Hypertension Mother    ? Hyperlipidemia Mother    ? Cancer Father      abdominal sarcoma   ? No Medical Problems Sister    ? No Medical Problems Brother    ? Glaucoma Sister      son with detached retinas   ? No Medical Problems Son    ? No Medical Problems Son        Social History   Substance Use Topics   ? Smoking status: Never Smoker   ? Smokeless tobacco: Never Used   ? Alcohol use 1.2 - 1.8 oz/week     2 - 3 Cans of beer per week      Comment: Occasional       Review of Systems  As above in HPI otherwise negative  Objective:     /56 (Patient Site: Left Arm, Patient Position: Sitting, Cuff Size: Adult Regular)  Pulse 69  Temp 98.3  F (36.8  C) (Oral)   Resp 16  Wt 158 lb 9.6 oz (71.9 kg)  SpO2 97%    Physical Exam  General: Patient is resting comfortably no acute distress is afebrile  HEENT: Head is normocephalic atraumatic   Lungs: Clear to auscultation bilaterally  Heart: Regular rate and rhythm  Skin: Without rash non-diaphoretic  Musculoskeletal: Focused examination of the left foot shows that she has no pain over the tarsals metatarsals or the forefoot it is all intact.  She is a small punctate hole on the plantar surface of the heel.  She has a slight pain with a squeeze of the calcaneus otherwise pain over the direct entry hole where the nail had went in and it made the calcaneus area tender.  No discharge from the wound.  No reactivity surrounding erythema edema.    Imaging    Xr Foot Left 2 Vws    Result Date: 7/13/2018  EXAM DATE:         07/13/2018 [facility] X-RAY FOOT LEFT, AP AND LATERAL 7/13/2018 4:45 PM INDICATION: puncture wound to heel - stepped on a nail COMPARISON: None. FINDINGS: Fixation lag screw present in the fifth metatarsal. No fracture, dislocation or other foreign body.     I personally reviewed and discussed findings with the patient.  My own  personal interpretation and radiologist will over read x-rays    Assessment:     Procedures     The encounter diagnosis was Puncture wound of left foot, initial encounter.    Plan:     1. Puncture wound of left foot, initial encounter  XR Foot Left 2 VWS    cephalexin (KEFLEX) 500 MG capsule         Patient Instructions     Protected weightbearing with crutches to help let the heel injury resolve and not be tender.  May transition to weightbearing as tolerated over the next several days  Use of ibuprofen 600 to 800 mg 3 times per day for analgesia.  Antibiotic as written.  Return to primary care provider for reevaluation treatment after completing the antibiotic if any complication or sequela should present.        As a result of our visit today, here are the action plans for you:    1. Medication(s) to stop: There are no discontinued medications.    2. Medication(s) to start or change:   Medications Ordered   Medications   ? cephalexin (KEFLEX) 500 MG capsule     Sig: Take 1 capsule (500 mg total) by mouth 4 (four) times a day for 10 days.     Dispense:  40 capsule     Refill:  0       3. Other instructions: Yes         Puncture Wound: Foot       A puncture wound occurs when a pointed object (such as a nail) pushes into the skin. It may go into the tissues below the skin of the foot, including fat and muscle. This type of wound is narrow and deep. They can be hard to clean. Puncture wounds are at high risk for becoming infected. One type of serious infection is more likely if you were wearing a rubber-soled shoe at the time of injury. Bacteria from the sole of the shoe may be dragged into the wound. Symptoms of infection may appear as late as 2 to 3 weeks after the injury. Be sure to watch for symptoms of infection and call your healthcare provider right away if any them appear.  X-rays may be done to see whether any objects remain under the skin. Your may also need a tetanus shot. This is given if you are not up  to date on this vaccination and the object that caused the wound may lead to tetanus.  Puncture wounds can easily become infected.   Home care    When you sit or lie down, raise the foot above the level of your heart. This helps reduce swelling and pain.    Do not put weight on the injured foot if it hurts to do so or if you were told to keep weight off the injury.    Your healthcare provider may prescribe an antibiotic. This is to help prevent infection. Follow all instructions for taking this medicine. Take the medicine every day until it is gone or you are told to stop. You should not have any left over.    The healthcare provider may prescribe medicines for pain. Follow instructions for taking them.    You can take acetaminophen or ibuprofen for pain, unless you were given a different pain medicine to use.     Follow the healthcare providers instructions on how to care for the wound.    Keep the wound clean and dry. Do not get the wound wet until you are told it is okay to do so. If the area gets wet, gently pat it dry with a clean cloth. Replace the wet bandage with a dry one.    If a bandage was applied and it becomes wet or dirty, replace it. Otherwise, leave it in place for the first 24 hours.    Once you can get the wound wet, you may shower as usual but do not soak the wound in water (no tub baths or swimming)    Check the wound daily for symptoms of infection. These include:  ? Increasing redness or swelling around the wound  ? Increased warmth of the wound  ? Worsening pain  ? Red streaking lines away from the wound  ? Draining pus  Follow-up care  Follow up with your healthcare provider as advised.   When to seek medical advice  Call your healthcare provider right away if any of these occur:    Any symptoms of infection (listed above)    Fever of 100.4 F (38. C) or higher, or as directed by your healthcare provider    Wound changes colors    Numbness around the wound    Decreased movement around the  injured area  Date Last Reviewed: 8/24/2015 2000-2017 [employer]. [address] [facility], [place] [address]. All rights reserved. This information is not intended as a substitute for professional medical care. Always follow your healthcare professional's instructions.

## 2021-06-27 ENCOUNTER — HEALTH MAINTENANCE LETTER (OUTPATIENT)
Age: 51
End: 2021-06-27

## 2021-10-17 ENCOUNTER — HEALTH MAINTENANCE LETTER (OUTPATIENT)
Age: 51
End: 2021-10-17

## 2022-07-24 ENCOUNTER — HEALTH MAINTENANCE LETTER (OUTPATIENT)
Age: 52
End: 2022-07-24

## 2022-10-02 ENCOUNTER — HEALTH MAINTENANCE LETTER (OUTPATIENT)
Age: 52
End: 2022-10-02

## 2023-08-12 ENCOUNTER — HEALTH MAINTENANCE LETTER (OUTPATIENT)
Age: 53
End: 2023-08-12